# Patient Record
(demographics unavailable — no encounter records)

---

## 2025-06-19 NOTE — REASON FOR VISIT
[Structural Heart and Valve Disease] : structural heart and valve disease [Follow-Up - Clinic] : a clinic follow-up of [Aortic Stenosis] : aortic stenosis [Atrial Fibrillation] : atrial fibrillation [Coronary Artery Disease] : coronary artery disease [Hyperlipidemia] : hyperlipidemia [Hypertension] : hypertension [FreeTextEntry3] : Dr. Dye [FreeTextEntry1] : I saw this 93-year-old man in f/u cardiac consultation on  06/19/25  He had a quadruple bypass in 1990. He has not had any recurrence and no angiography and stents He had several ablations for atrial fibrillation but currently is in atrial fibrillation. He had a AAA stent He is being treated for prostate cancer for the last year. A Nuclear stress test showed an old inferior  infarct with an EF of 55% 2023 he was  hospitalized for pneumonia. A subsequent CT scan of the chest suggested a mural thrombus in the left ventricle. An echo with contrast showed no evidence of a thrombus. His main complaint today is difficulty walking. He has some edema of his legs, probably due to a sedentary existence DILEEP of the legs showed no vascular insufficiency. In 02/20 he was hospitalized in [place] and found to have cervical and lumbar stenosis requiring surgery in May 20 perioperatively he developed congestive heart failure was intubated and eventually discharged from the hospital.  3 months ago he underwent LINA as well as a transthoracic to evaluate his aortic valve which showed moderate aortic stenosis. Repeat echo done  07/22 shows moderate aortic stenosis.  Ejection fraction of 50% While in [place] this past winter 2024  he had a TAVR procedure for his aortic stenosis. This went without any issues and he seems to be feeling better. He is complaining of fatigue and seems to run a low pulse rate, and A-fib.

## 2025-06-19 NOTE — DISCUSSION/SUMMARY
[FreeTextEntry1] : 1) Echo to assess Aortic stenosis showed  moderate Aortic stenosis.  This past winter 2024  he had a TAVR procedure 2) Pharm. stress test showed an old infarct no ischemia (2019) 3) Carotid doppler showed moderate bilateral disease He will wear a 3-day monitor to see how significant his bradycardia is.  If it is he may benefit from an Micra pacemaker No contra indication to taling Doxazocin  [EKG obtained to assist in diagnosis and management of assessed problem(s)] : EKG obtained to assist in diagnosis and management of assessed problem(s)

## 2025-06-19 NOTE — PHYSICAL EXAM
[General Appearance - Well Developed] : well developed [Normal Appearance] : normal appearance [Well Groomed] : well groomed [General Appearance - Well Nourished] : well nourished [No Deformities] : no deformities [General Appearance - In No Acute Distress] : no acute distress [Normal Conjunctiva] : the conjunctiva exhibited no abnormalities [Eyelids - No Xanthelasma] : the eyelids demonstrated no xanthelasmas [Normal Oral Mucosa] : normal oral mucosa [No Oral Pallor] : no oral pallor [No Oral Cyanosis] : no oral cyanosis [Normal Jugular Venous A Waves Present] : normal jugular venous A waves present [Normal Jugular Venous V Waves Present] : normal jugular venous V waves present [No Jugular Venous Chan A Waves] : no jugular venous chan A waves [Respiration, Rhythm And Depth] : normal respiratory rhythm and effort [Exaggerated Use Of Accessory Muscles For Inspiration] : no accessory muscle use [Auscultation Breath Sounds / Voice Sounds] : lungs were clear to auscultation bilaterally [Heart Rate And Rhythm] : heart rate and rhythm were normal [Heart Sounds] : normal S1 and S2 [Systolic grade ___/6] : A grade [unfilled]/6 systolic murmur was heard. [Abdomen Soft] : soft [Abdomen Tenderness] : non-tender [Abdomen Mass (___ Cm)] : no abdominal mass palpated [Abnormal Walk] : normal gait [Gait - Sufficient For Exercise Testing] : the gait was sufficient for exercise testing [Nail Clubbing] : no clubbing of the fingernails [Cyanosis, Localized] : no localized cyanosis [Petechial Hemorrhages (___cm)] : no petechial hemorrhages [] : no rash [No Skin Ulcers] : no skin ulcer [No Xanthoma] : no  xanthoma was observed [Oriented To Time, Place, And Person] : oriented to person, place, and time [Affect] : the affect was normal [Mood] : the mood was normal [No Anxiety] : not feeling anxious [FreeTextEntry1] : Ecchymotic

## 2025-06-19 NOTE — HISTORY OF PRESENT ILLNESS
[FreeTextEntry1] : he has no chest pain\par  He has no shortness of breath\par  He has no palpitations\par  He has no syncope\par  He is neurologically intact\par  He has no edema\par  He has no GI symptoms\par  Complains of fatigue